# Patient Record
Sex: FEMALE | Race: WHITE | NOT HISPANIC OR LATINO | URBAN - METROPOLITAN AREA
[De-identification: names, ages, dates, MRNs, and addresses within clinical notes are randomized per-mention and may not be internally consistent; named-entity substitution may affect disease eponyms.]

---

## 2022-09-15 ENCOUNTER — APPOINTMENT (OUTPATIENT)
Dept: MRI IMAGING | Facility: CLINIC | Age: 72
End: 2022-09-15
Attending: NURSE PRACTITIONER
Payer: COMMERCIAL

## 2022-09-15 ENCOUNTER — APPOINTMENT (OUTPATIENT)
Dept: ULTRASOUND IMAGING | Facility: CLINIC | Age: 72
End: 2022-09-15
Attending: EMERGENCY MEDICINE
Payer: COMMERCIAL

## 2022-09-15 ENCOUNTER — HOSPITAL ENCOUNTER (OUTPATIENT)
Facility: CLINIC | Age: 72
Setting detail: OBSERVATION
Discharge: HOME | End: 2022-09-16
Attending: EMERGENCY MEDICINE | Admitting: HOSPITALIST
Payer: COMMERCIAL

## 2022-09-15 DIAGNOSIS — K85.10 ACUTE BILIARY PANCREATITIS, UNSPECIFIED COMPLICATION STATUS: ICD-10-CM

## 2022-09-15 DIAGNOSIS — R74.8 ELEVATED LIVER ENZYMES: ICD-10-CM

## 2022-09-15 DIAGNOSIS — R10.13 EPIGASTRIC PAIN: ICD-10-CM

## 2022-09-15 DIAGNOSIS — K80.50 CHOLEDOCHOLITHIASIS: ICD-10-CM

## 2022-09-15 LAB
ALBUMIN SERPL-MCNC: 3.7 G/DL (ref 3.4–5)
ALP SERPL-CCNC: 126 U/L (ref 40–150)
ALT SERPL W P-5'-P-CCNC: 411 U/L (ref 0–50)
ANION GAP SERPL CALCULATED.3IONS-SCNC: 8 MMOL/L (ref 3–14)
AST SERPL W P-5'-P-CCNC: 920 U/L (ref 0–45)
BASOPHILS # BLD AUTO: 0 10E3/UL (ref 0–0.2)
BASOPHILS NFR BLD AUTO: 0 %
BILIRUB SERPL-MCNC: 1.5 MG/DL (ref 0.2–1.3)
BUN SERPL-MCNC: 16 MG/DL (ref 7–30)
CALCIUM SERPL-MCNC: 8.8 MG/DL (ref 8.5–10.1)
CHLORIDE BLD-SCNC: 107 MMOL/L (ref 94–109)
CO2 SERPL-SCNC: 26 MMOL/L (ref 20–32)
CREAT SERPL-MCNC: 0.54 MG/DL (ref 0.52–1.04)
EOSINOPHIL # BLD AUTO: 0.1 10E3/UL (ref 0–0.7)
EOSINOPHIL NFR BLD AUTO: 1 %
ERYTHROCYTE [DISTWIDTH] IN BLOOD BY AUTOMATED COUNT: 12.4 % (ref 10–15)
GFR SERPL CREATININE-BSD FRML MDRD: >90 ML/MIN/1.73M2
GLUCOSE BLD-MCNC: 111 MG/DL (ref 70–99)
HCT VFR BLD AUTO: 42.1 % (ref 35–47)
HGB BLD-MCNC: 13.6 G/DL (ref 11.7–15.7)
HOLD SPECIMEN: NORMAL
IMM GRANULOCYTES # BLD: 0 10E3/UL
IMM GRANULOCYTES NFR BLD: 0 %
LIPASE SERPL-CCNC: 508 U/L (ref 73–393)
LYMPHOCYTES # BLD AUTO: 1.5 10E3/UL (ref 0.8–5.3)
LYMPHOCYTES NFR BLD AUTO: 18 %
MAGNESIUM SERPL-MCNC: 2.3 MG/DL (ref 1.6–2.3)
MCH RBC QN AUTO: 29.1 PG (ref 26.5–33)
MCHC RBC AUTO-ENTMCNC: 32.3 G/DL (ref 31.5–36.5)
MCV RBC AUTO: 90 FL (ref 78–100)
MONOCYTES # BLD AUTO: 0.6 10E3/UL (ref 0–1.3)
MONOCYTES NFR BLD AUTO: 7 %
NEUTROPHILS # BLD AUTO: 6.1 10E3/UL (ref 1.6–8.3)
NEUTROPHILS NFR BLD AUTO: 74 %
NRBC # BLD AUTO: 0 10E3/UL
NRBC BLD AUTO-RTO: 0 /100
PLATELET # BLD AUTO: 315 10E3/UL (ref 150–450)
POTASSIUM BLD-SCNC: 3.2 MMOL/L (ref 3.4–5.3)
POTASSIUM BLD-SCNC: 3.6 MMOL/L (ref 3.4–5.3)
PROT SERPL-MCNC: 7.3 G/DL (ref 6.8–8.8)
RBC # BLD AUTO: 4.68 10E6/UL (ref 3.8–5.2)
SARS-COV-2 RNA RESP QL NAA+PROBE: NEGATIVE
SODIUM SERPL-SCNC: 141 MMOL/L (ref 133–144)
TRIGL SERPL-MCNC: 69 MG/DL
TROPONIN I SERPL HS-MCNC: 4 NG/L
WBC # BLD AUTO: 8.3 10E3/UL (ref 4–11)

## 2022-09-15 PROCEDURE — 96374 THER/PROPH/DIAG INJ IV PUSH: CPT | Mod: 59

## 2022-09-15 PROCEDURE — 74183 MRI ABD W/O CNTR FLWD CNTR: CPT

## 2022-09-15 PROCEDURE — 76705 ECHO EXAM OF ABDOMEN: CPT

## 2022-09-15 PROCEDURE — 84478 ASSAY OF TRIGLYCERIDES: CPT | Performed by: NURSE PRACTITIONER

## 2022-09-15 PROCEDURE — 258N000003 HC RX IP 258 OP 636: Performed by: NURSE PRACTITIONER

## 2022-09-15 PROCEDURE — G0378 HOSPITAL OBSERVATION PER HR: HCPCS

## 2022-09-15 PROCEDURE — 250N000011 HC RX IP 250 OP 636: Performed by: EMERGENCY MEDICINE

## 2022-09-15 PROCEDURE — 255N000002 HC RX 255 OP 636: Performed by: EMERGENCY MEDICINE

## 2022-09-15 PROCEDURE — 99285 EMERGENCY DEPT VISIT HI MDM: CPT | Mod: 25

## 2022-09-15 PROCEDURE — 36415 COLL VENOUS BLD VENIPUNCTURE: CPT | Performed by: EMERGENCY MEDICINE

## 2022-09-15 PROCEDURE — C9803 HOPD COVID-19 SPEC COLLECT: HCPCS

## 2022-09-15 PROCEDURE — 99219 PR INITIAL OBSERVATION CARE,LEVEL II: CPT | Performed by: NURSE PRACTITIONER

## 2022-09-15 PROCEDURE — 93005 ELECTROCARDIOGRAM TRACING: CPT

## 2022-09-15 PROCEDURE — 250N000013 HC RX MED GY IP 250 OP 250 PS 637: Performed by: NURSE PRACTITIONER

## 2022-09-15 PROCEDURE — 83735 ASSAY OF MAGNESIUM: CPT | Performed by: NURSE PRACTITIONER

## 2022-09-15 PROCEDURE — U0003 INFECTIOUS AGENT DETECTION BY NUCLEIC ACID (DNA OR RNA); SEVERE ACUTE RESPIRATORY SYNDROME CORONAVIRUS 2 (SARS-COV-2) (CORONAVIRUS DISEASE [COVID-19]), AMPLIFIED PROBE TECHNIQUE, MAKING USE OF HIGH THROUGHPUT TECHNOLOGIES AS DESCRIBED BY CMS-2020-01-R: HCPCS | Performed by: EMERGENCY MEDICINE

## 2022-09-15 PROCEDURE — 36415 COLL VENOUS BLD VENIPUNCTURE: CPT | Performed by: NURSE PRACTITIONER

## 2022-09-15 PROCEDURE — 84484 ASSAY OF TROPONIN QUANT: CPT | Performed by: EMERGENCY MEDICINE

## 2022-09-15 PROCEDURE — 85025 COMPLETE CBC W/AUTO DIFF WBC: CPT | Performed by: EMERGENCY MEDICINE

## 2022-09-15 PROCEDURE — 84132 ASSAY OF SERUM POTASSIUM: CPT | Mod: 91 | Performed by: NURSE PRACTITIONER

## 2022-09-15 PROCEDURE — A9585 GADOBUTROL INJECTION: HCPCS | Performed by: EMERGENCY MEDICINE

## 2022-09-15 PROCEDURE — 83690 ASSAY OF LIPASE: CPT | Performed by: EMERGENCY MEDICINE

## 2022-09-15 PROCEDURE — 80053 COMPREHEN METABOLIC PANEL: CPT | Performed by: EMERGENCY MEDICINE

## 2022-09-15 RX ORDER — LEVOTHYROXINE SODIUM 50 UG/1
50 TABLET ORAL DAILY
Status: DISCONTINUED | OUTPATIENT
Start: 2022-09-15 | End: 2022-09-16 | Stop reason: HOSPADM

## 2022-09-15 RX ORDER — HYDROMORPHONE HCL IN WATER/PF 6 MG/30 ML
0.2 PATIENT CONTROLLED ANALGESIA SYRINGE INTRAVENOUS
Status: COMPLETED | OUTPATIENT
Start: 2022-09-15 | End: 2022-09-15

## 2022-09-15 RX ORDER — NORTRIPTYLINE HCL 10 MG
10 CAPSULE ORAL AT BEDTIME
Status: DISCONTINUED | OUTPATIENT
Start: 2022-09-15 | End: 2022-09-16 | Stop reason: HOSPADM

## 2022-09-15 RX ORDER — BIMATOPROST 3 UG/ML
SOLUTION TOPICAL AT BEDTIME
Status: DISCONTINUED | OUTPATIENT
Start: 2022-09-15 | End: 2022-09-15

## 2022-09-15 RX ORDER — NORTRIPTYLINE HCL 10 MG
10 CAPSULE ORAL AT BEDTIME
COMMUNITY

## 2022-09-15 RX ORDER — ACETAMINOPHEN 325 MG/1
325 TABLET ORAL EVERY 6 HOURS PRN
Status: DISCONTINUED | OUTPATIENT
Start: 2022-09-15 | End: 2022-09-16 | Stop reason: HOSPADM

## 2022-09-15 RX ORDER — ONDANSETRON 2 MG/ML
4 INJECTION INTRAMUSCULAR; INTRAVENOUS EVERY 6 HOURS PRN
Status: DISCONTINUED | OUTPATIENT
Start: 2022-09-15 | End: 2022-09-16 | Stop reason: HOSPADM

## 2022-09-15 RX ORDER — ONDANSETRON 4 MG/1
4 TABLET, ORALLY DISINTEGRATING ORAL EVERY 6 HOURS PRN
Status: DISCONTINUED | OUTPATIENT
Start: 2022-09-15 | End: 2022-09-16 | Stop reason: HOSPADM

## 2022-09-15 RX ORDER — POTASSIUM CHLORIDE 1500 MG/1
40 TABLET, EXTENDED RELEASE ORAL ONCE
Status: DISCONTINUED | OUTPATIENT
Start: 2022-09-15 | End: 2022-09-16 | Stop reason: ALTCHOICE

## 2022-09-15 RX ORDER — DICYCLOMINE HYDROCHLORIDE 10 MG/1
1-2 CAPSULE ORAL 3 TIMES DAILY PRN
COMMUNITY
Start: 2022-06-13

## 2022-09-15 RX ORDER — SODIUM CHLORIDE, SODIUM LACTATE, POTASSIUM CHLORIDE, CALCIUM CHLORIDE 600; 310; 30; 20 MG/100ML; MG/100ML; MG/100ML; MG/100ML
INJECTION, SOLUTION INTRAVENOUS CONTINUOUS
Status: DISCONTINUED | OUTPATIENT
Start: 2022-09-15 | End: 2022-09-16 | Stop reason: HOSPADM

## 2022-09-15 RX ORDER — ASPIRIN 81 MG/1
81 TABLET ORAL DAILY
COMMUNITY

## 2022-09-15 RX ORDER — LEVOTHYROXINE SODIUM 50 UG/1
50 TABLET ORAL DAILY
COMMUNITY
Start: 2022-01-18

## 2022-09-15 RX ORDER — ELECTROLYTES/DEXTROSE
1 SOLUTION, ORAL ORAL DAILY
COMMUNITY

## 2022-09-15 RX ORDER — PROCHLORPERAZINE MALEATE 5 MG
5 TABLET ORAL EVERY 6 HOURS PRN
Status: DISCONTINUED | OUTPATIENT
Start: 2022-09-15 | End: 2022-09-16 | Stop reason: HOSPADM

## 2022-09-15 RX ORDER — GADOBUTROL 604.72 MG/ML
6 INJECTION INTRAVENOUS ONCE
Status: COMPLETED | OUTPATIENT
Start: 2022-09-15 | End: 2022-09-15

## 2022-09-15 RX ORDER — BIMATOPROST 3 UG/ML
SOLUTION TOPICAL
COMMUNITY
Start: 2022-06-14

## 2022-09-15 RX ORDER — SIMVASTATIN 20 MG
20 TABLET ORAL AT BEDTIME
COMMUNITY

## 2022-09-15 RX ORDER — LEVOTHYROXINE SODIUM 25 UG/1
TABLET ORAL DAILY
COMMUNITY
End: 2022-09-15

## 2022-09-15 RX ORDER — PROCHLORPERAZINE 25 MG
12.5 SUPPOSITORY, RECTAL RECTAL EVERY 12 HOURS PRN
Status: DISCONTINUED | OUTPATIENT
Start: 2022-09-15 | End: 2022-09-16 | Stop reason: HOSPADM

## 2022-09-15 RX ADMIN — SODIUM CHLORIDE, POTASSIUM CHLORIDE, SODIUM LACTATE AND CALCIUM CHLORIDE: 600; 310; 30; 20 INJECTION, SOLUTION INTRAVENOUS at 10:55

## 2022-09-15 RX ADMIN — HYDROMORPHONE HYDROCHLORIDE 0.2 MG: 0.2 INJECTION, SOLUTION INTRAMUSCULAR; INTRAVENOUS; SUBCUTANEOUS at 07:30

## 2022-09-15 RX ADMIN — SODIUM CHLORIDE, POTASSIUM CHLORIDE, SODIUM LACTATE AND CALCIUM CHLORIDE: 600; 310; 30; 20 INJECTION, SOLUTION INTRAVENOUS at 22:11

## 2022-09-15 RX ADMIN — LEVOTHYROXINE SODIUM 50 MCG: 50 TABLET ORAL at 14:12

## 2022-09-15 RX ADMIN — GADOBUTROL 6 ML: 604.72 INJECTION INTRAVENOUS at 12:57

## 2022-09-15 RX ADMIN — NORTRIPTYLINE HYDROCHLORIDE 10 MG: 10 CAPSULE ORAL at 22:10

## 2022-09-15 RX ADMIN — SODIUM CHLORIDE, POTASSIUM CHLORIDE, SODIUM LACTATE AND CALCIUM CHLORIDE: 600; 310; 30; 20 INJECTION, SOLUTION INTRAVENOUS at 13:20

## 2022-09-15 ASSESSMENT — ACTIVITIES OF DAILY LIVING (ADL)
ADLS_ACUITY_SCORE: 33
ADLS_ACUITY_SCORE: 33
ADLS_ACUITY_SCORE: 35
ADLS_ACUITY_SCORE: 35
ADLS_ACUITY_SCORE: 33
ADLS_ACUITY_SCORE: 33
ADLS_ACUITY_SCORE: 35
ADLS_ACUITY_SCORE: 33
ADLS_ACUITY_SCORE: 35

## 2022-09-15 NOTE — PHARMACY-ADMISSION MEDICATION HISTORY
Pharmacy Medication History  Admission medication history interview status for the 9/15/2022  admission is complete. See EPIC admission navigator for prior to admission medications     Location of Interview: Patient room  Medication history sources: Patient and Surescripts    Significant changes made to the medication list:  Added:   - Latisse 0.03% eye solution - apply to upper lashline daily at bedtime, per pt & Surescripts   - Bentyl 10mg capsule - 1-2 capsules three times daily prn cramping, per pt & Surescripts   - multivitamin tablet - 1 tablet daily, per pt   - aspirin 81mg EC tablet - 1 tablet daily, per pt     Deleted: None  Changed:   - levothyroxine 25mcg --> 50mcg tablet daily, per pt & Surescripts    In the past week, patient estimated taking medication this percent of the time: greater than 90%    Additional medication history information:   - Patient had medication list in phone to verify meds and states took last dose yesterday PM  - Per pt, PCP in [place] recommended she take aspirin 81mg tablets daily for heart health  - Pt states she prefers any discharge medications to be sent to [id number] in [place], [place]      Medication reconciliation completed by provider prior to medication history? No    Time spent in this activity: 15 minutes    Prior to Admission medications    Medication Sig Last Dose Taking? Auth Provider Long Term End Date   aspirin 81 MG EC tablet Take 81 mg by mouth daily 9/14/2022 at AM Yes Unknown, Entered By History     bimatoprost (LATISSE) 0.03 % external opthalmic solution APPLY EVENLY ALONG THE SKIN OF THE UPPER EYELIDS AT THE BASE OF THE EYELASHES ONCE DAILY AT BEDTIME 9/14/2022 at PM Yes Unknown, Entered By History     dicyclomine (BENTYL) 10 MG capsule Take 1-2 capsules by mouth 3 times daily as needed for cramping 9/14/2022 at PM Yes Unknown, Entered By History     levothyroxine (SYNTHROID/LEVOTHROID) 50 MCG tablet Take 50 mcg by mouth daily 9/14/2022 at AM Yes  Unknown, Entered By History Yes    Multiple Vitamin (MULTIVITAMIN ADULT) TABS Take 1 tablet by mouth daily 9/14/2022 at AM Yes Unknown, Entered By History     nortriptyline (PAMELOR) 10 MG capsule Take 10 mg by mouth At Bedtime 9/14/2022 at PM Yes Reported, Patient Yes    simvastatin (ZOCOR) 20 MG tablet Take 20 mg by mouth At Bedtime 9/14/2022 at PM Yes Reported, Patient Yes        The information provided in this note is only as accurate as the sources available at the time of update(s)

## 2022-09-15 NOTE — CONSULTS
[facility] Consultation     Judith Almeida  [address] [place] [address]  72 year old female     Admission Date/Time: 9/15/2022  Primary Care Provider: Maryam Ref-Primary, Physician  Referring / Attending Physician:  Sharron Sylvester     We were asked to see the patient in consultation by Sharron Sylvester CNP in the ED for evaluation of elevated LFTs.    ASSESSMENT:    LFTs/lipase are suggestive of either recently passed biliary stone/sludge, or acutely impacted stone/sludge.    RECOMMENDATIONS:  MRCP  Repeat liver chemistries in the morning  N.p.o. at midnight    Thank you for involving us in this patient's care.  Please call me with any questions.    Total time spent in chart review, direct medical discussion, examination, and documentation was 40 minutes      AFTERNOON ADDENDUM:  MRCP neg for retained stone.   Suggest diet be advanced, recheck LFTs in the AM.     Fracisco Hernández DO   [facility]  Cell [phone]    ________________________________________________________________________        CC: Abdominal pain     HPI:  Judith Almeida is a 72 year old female who we are asked to see by the emergency room staff for abdominal pain.    The patient had her gallbladder taken out greater than 10 years ago, but over the last 3 years she has had episodic issues (3 total) of abdominal pain requiring urgent evaluation.  At least 1 of these prior evaluations have included an MRCP and she tells me that no further intervention was needed.  This was apparently 1 year ago.    She recalls present issue with abdominal pain starting last night.  This started relatively acutely, in the epigastrium and right upper quadrant.  There is no specific radiation, or palliative or provocative factors.  She had some mild nausea.  There was one episode of diarrhea.  Since being seen in the emergency department she has had improvement in her pain after narcotic use.  She was found to have significantly elevated  "AST greater than ALT, total bilirubin 1.5, lipase 508.  She does have an ultrasound demonstrating status postcholecystectomy, common bile duct dilation of 11 mm.     ROS: A comprehensive ten point review of systems was negative aside from those in mentioned in the HPI.      PAST MEDICAL HISTORY:  Patient Active Problem List    Diagnosis Date Noted     Choledocholithiasis 09/15/2022     Priority: Medium     Epigastric pain 09/15/2022     Priority: Medium     Elevated liver enzymes 09/15/2022     Priority: Medium     Acute biliary pancreatitis, unspecified complication status 09/15/2022     Priority: Medium     SOCIAL HISTORY:  Social History     Substance Use Topics     Alcohol use: Never     Drug use: Never     FAMILY HISTORY:  History reviewed. No pertinent family history.  ALLERGIES:   Allergies   Allergen Reactions     Dilaudid [Hydromorphone]      Hypotension, near syncope     MEDICATIONS:   Current Facility-Administered Medications   Medication     acetaminophen (TYLENOL) tablet 325 mg     lactated ringers infusion     ondansetron (ZOFRAN) injection 4 mg    Or     ondansetron (ZOFRAN ODT) ODT tab 4 mg     Current Outpatient Medications   Medication     aspirin 81 MG EC tablet     bimatoprost (LATISSE) 0.03 % external opthalmic solution     dicyclomine (BENTYL) 10 MG capsule     levothyroxine (SYNTHROID/LEVOTHROID) 50 MCG tablet     Multiple Vitamin (MULTIVITAMIN ADULT) TABS     nortriptyline (PAMELOR) 10 MG capsule     simvastatin (ZOCOR) 20 MG tablet     PHYSICAL EXAM:   /75   Pulse 92   Temp 97.8  F (36.6  C) (Oral)   Resp 14   Ht 1.676 m (5' 6\")   Wt 56.7 kg (125 lb)   SpO2 99%   BMI 20.18 kg/m     GEN: Alert, oriented x3, communicative and in NAD.  GALINA: AT, anicteric, OP without erythema, exudate, or ulcers.    NECK: Supple.    LYMPH: No LAD noted.  HRT: RRR  LUNGS: CTA  ABD: ND, +BS, mild tenderness to palpate been in the epigastrium and right upper quadrant without rebound.  SKIN: No rash, " jaundice or spider angiomata     ADDITIONAL DATA:   I reviewed the patient's new clinical lab test results.   Recent Labs   Lab Test 09/15/22  0649   WBC 8.3   HGB 13.6   MCV 90        Recent Labs   Lab Test 09/15/22  0649   POTASSIUM 3.2*   CHLORIDE 107   CO2 26   BUN 16   ANIONGAP 8     Recent Labs   Lab Test 09/15/22  0649   ALBUMIN 3.7   BILITOTAL 1.5*   *   *   LIPASE 508*        I reviewed the patient's new imaging results.

## 2022-09-15 NOTE — H&P
[facility]    History and Physical - Hospitalist Service       Date of Admission:  9/15/2022    Assessment & Plan      Judith Almeida is a 72 year old female w/ PMH of irritable bowel syndrome, hypothyroidism, hyperlipidemia, skin cancer registered observation on 9/15/2022 for possible choledocholithiasis    abnl LFTs w/ hepatocellular and cholestatic pattern, possible choledocholithiasis vs mild pancreatitis by ultrasound with CBD of 11 mm multiple hepatic cyst in a patient who status post cholecystectomy  -consult [facility] for possible ERCP vs MRCP--> discussed with gastroenterology attending physician & will obtain MRCP first  -NPO  -IVF  -analgesia w/ low dose apap and antiemetics  -check triglycerides  -trend CMP and lipase in a.m.    Hypotenion in ED 2/2 asovagal episode vs medication side effects in ED following opioid administration w/o LOC  -telemetry  -orthostatic BPs  -ekg  -Opioids listed as intolerance/side effect    Hypokalemia, mild 3.2  -add on Mg level & replacement protocol  -replace K via protocol    HLD  -hold statin w/ acute hepatitis    Hypothyroidism  -continue PTA levothryroxine    covid-19 status  -neg 09/15/22       Diet: NPO for Medical/Clinical Reasons Except for: Meds    DVT Prophylaxis: Ambulate every shift  Cobb Catheter: Not present  Central Lines: None  Cardiac Monitoring: Telemetry x24 hours  Code Status: Full Code full code    Clinically Significant Risk Factors Present on Admission                          Disposition Plan     TBD pending clinical course    The patient's care was discussed with the Attending Physician, Dr. Veronica Singh and Patient.    Total time is 35 minutes of which 21 minutes was face-to-face interviewing and examining patient.     ELYSSA Coker [facility]  Hospitalist Service  [facility]  Securely message with the Vocera Web Console (learn more here)  Text page via [employer] Paging/Directory      ______________________________________________________________________    Chief Complaint   Abdominal pain    History is obtained from the patient    History of Present Illness   Judith Almeida is a 72 year old female with irritable bowel syndrome, hypothyroidism, hyperlipidemia, skin cancer who presented to [facility] ED with sudden onset abdominal pain beginning at 10 PM on 2022.  Patient describes the pain as 8/10 in severity, deep and burning in the epigastrium.  She had minimal temporary relief with Tums.  The pain persisted all evening prompting her to seek ED evaluation on the a.m. of 9/15/2022.    In the ED patient underwent right upper quadrant ultrasound which did not show any stone but the common bile duct was dilated to 11 mm.  There are multiple hepatic cyst.  She was given 0.2 mg IV hydromorphone thereafter became acutely hypotensive 57/30, 61/41 with heart rates in the 40s.  There is no loss of consciousness but patient was presyncopal.  She was laid flat and given 1 L fluid bolus with 0.9% saline with subsequent resumption of hemodynamic stability.  Lab data were remarkable only for elevated AST, ALT, total bilirubin and lipase.  Hospitalist service was asked to register the patient observation for possible choledocholithiasis.    Review of Systems    A complete 10 point ROS was obtained, see above for pertinent positives or negatives, all other systems are negative    Past Medical History    I have reviewed this patient's medical history and updated it with pertinent information if needed.   Past Medical History:   Diagnosis Date     High cholesterol      Irritable bowel syndrome    skin cancer    Past Surgical History   I have reviewed this patient's surgical history and updated it with pertinent information if needed.  History reviewed. No pertinent surgical history.  Cholecystectomy   x2  RSO secondary to cyst    Social History   I have reviewed this patient's social history  and updated it with pertinent information if needed.  Social History     Substance Use Topics     Alcohol use: Never     Drug use: Never   remote/former THC and smoking use  Works for [place] [employer], resides in [place], [place]  Receives primary care and gastroenterology treatment in [place]    Family History   2 children alive and well    Prior to Admission Medications   Prior to Admission Medications   Prescriptions Last Dose Informant Patient Reported? Taking?   Multiple Vitamin (MULTIVITAMIN ADULT) TABS 9/14/2022 at AM  Yes Yes   Sig: Take 1 tablet by mouth daily   aspirin 81 MG EC tablet 9/14/2022 at AM  Yes Yes   Sig: Take 81 mg by mouth daily   bimatoprost (LATISSE) 0.03 % external opthalmic solution 9/14/2022 at PM  Yes Yes   Sig: APPLY EVENLY ALONG THE SKIN OF THE UPPER EYELIDS AT THE BASE OF THE EYELASHES ONCE DAILY AT BEDTIME   dicyclomine (BENTYL) 10 MG capsule 9/14/2022 at PM  Yes Yes   Sig: Take 1-2 capsules by mouth 3 times daily as needed for cramping   levothyroxine (SYNTHROID/LEVOTHROID) 50 MCG tablet 9/14/2022 at AM  Yes Yes   Sig: Take 50 mcg by mouth daily   nortriptyline (PAMELOR) 10 MG capsule 9/14/2022 at PM  Yes Yes   Sig: Take 10 mg by mouth At Bedtime   simvastatin (ZOCOR) 20 MG tablet 9/14/2022 at PM  Yes Yes   Sig: Take 20 mg by mouth At Bedtime      Facility-Administered Medications: None     Allergies   Allergies   Allergen Reactions     Dilaudid [Hydromorphone]      Hypotension, near syncope       Physical Exam   Vital Signs: Temp: 97.8  F (36.6  C) Temp src: Oral BP: 132/75 Pulse: 92   Resp: 14 SpO2: 99 % O2 Device: None (Room air)    Weight: 125 lbs 0 oz    Constitutional: vs /84, heart 98, SPO2 90% on room air  General:  adult pt lying in bed without acute distress  Neuro: +follows commands wiggle toes and show 2 fingers bilat, face symmetric, tongue midline, speech fluent  Eyes pupils equal round 3mm briskly reactive bilat, sclera nonicteric, noninjected,  conjunctiva pink,  Head, ENT & mouth: NC/AT,  mouth moist oral mucosa  Neck: supple  CV S1S2 no murmurs, normotensive  resp: CTAB upper and lower lobes  gi:normoactive bowel sounds, soft, nontender, nondisteded  Ext: no edema  Skin: no rashes on exposed skin  Lymph: Defer  Musculoskeletal no bony joint deformities    Data   Data reviewed today: I reviewed all medications, new labs and imaging results over the last 24 hours. EKG is pending.      Recent Labs   Lab 09/15/22  0649   WBC 8.3   HGB 13.6   MCV 90         POTASSIUM 3.2*   CHLORIDE 107   CO2 26   BUN 16   CR 0.54   ANIONGAP 8   LESLY 8.8   *   ALBUMIN 3.7   PROTTOTAL 7.3   BILITOTAL 1.5*   ALKPHOS 126   *   *   LIPASE 508*     Recent Results (from the past 24 hour(s))   US Abdomen Limited    Narrative    ULTRASOUND ABDOMEN LIMITED  9/15/2022 8:28 AM    CLINICAL HISTORY: Right lower quadrant pain.    TECHNIQUE: Limited abdominal ultrasound.    COMPARISON: None.    FINDINGS:  GALLBLADDER: Cholecystectomy.    BILE DUCTS: There is no biliary dilatation. The common duct measures  11 mm.    LIVER: Liver measures 16.7 cm. There are multiple hepatic cysts that  are mildly lobulated but otherwise appear simple. An example at the  lateral left liver is 1.8 x 1.1 x 1.8 cm. There are other bilateral  examples.    RIGHT KIDNEY: No hydronephrosis.    PANCREAS: Pancreas duct is mildly ectatic measuring 4 mm.    No ascites.      Impression    IMPRESSION:  1.  Cholecystectomy. Dilated common duct measuring 11 mm and mildly  ectatic pancreas duct measuring 4 mm.  2.  A few scattered hepatic cysts noted.

## 2022-09-15 NOTE — ED NOTES
"Pt ambulated to bathroom with no dizziness.  States feeling \"much better\" and pain is better controlled.    "

## 2022-09-15 NOTE — ED NOTES
"Pt on call light reporting \"feeling terrible from pain meds.\" Vasovagal response, dropped BP to 50s SBP, and to. Updated primary RN and Primary MD, started fluids, put head of bed down. Pt able to recover BP and HR back to normal.   "

## 2022-09-15 NOTE — PROGRESS NOTES
09/15/2022 [id number]    Pt here for abdominal pain due to choledocholithiasis. Hx: IBS, hypothyroid, HLP, skin cancer. VSS on RA. Pt became hypotensive and to in the ED which was managed with fluids and pt is stable now. Continent B&B. Ambulating to BR. IND in room. K+: 3.2. pt refused potassium replacement as it increases her bowels and makes her IBS worse. Rechecked K+ at 1600 and it is 3.6. PIV infusing LR at 100mL/hr. Regular diet. Will be NPO at [place] (possible surgery tomorrow pending results of MRI). Tele: SR.  at bedside.       Observation goals:   -diagnostic tests and consults completed and resulted: not met  -vital signs normal or at patient's baseline: met  -tolerating oral intake to maintain hydration: in process. Regular diet now but NPO at MN  -safe disposition plan has been identified: met      Nurse to notify provider when observation goals have been met and patient is ready for discharge.

## 2022-09-15 NOTE — ED PROVIDER NOTES
"  History   Chief Complaint:  Abdominal Pain         HPI   Judith Almeida is a 72 year old female with history of IBS who presents for evaluation of burning epigastric abdominal pain that began at 10 PM last night.  The pain started in her nightly but has now become more constant.    The patient flew in from [facility] with her .  This has happened before when she travels but this episode is more intense.  Patient had an episode of diarrhea yesterday afternoon approximately 2 PM.  No reported melena or hematochezia.  Patient denies vomiting but is nauseous which she attributes to the pain increase.  The patient denies fever or dysuria.  The patient is status post cholecystectomy    Allergies:  No Known Allergies    Medications:   levothyroxine (SYNTHROID/LEVOTHROID) 25 MCG tablet  nortriptyline (PAMELOR) 10 MG capsule  simvastatin (ZOCOR) 20 MG tablet        Past Medical History:    Past Medical History:   Diagnosis Date     High cholesterol      Irritable bowel syndrome      There is no problem list on file for this patient.       Past Surgical History:    History reviewed. No pertinent surgical history.     Family History:    History reviewed. No pertinent family history.    Social History:  PCP: Physician No Ref-Primary  Presents to the ED with her   Patient lives in [facility].  She recently arrived via airline to [place].  Social History     Substance Use Topics     Alcohol use: Never     Drug use: Never       Review of Systems  See the HPI, otherwise the rest of the ROS is negative.      Physical Exam     Patient Vitals for the past 24 hrs:   BP Temp Temp src Pulse Resp SpO2 Height Weight   09/15/22 1101 -- -- -- -- -- -- 1.676 m (5' 6\") 56.7 kg (125 lb)   09/15/22 1045 -- -- -- -- -- 99 % -- --   09/15/22 1030 132/75 -- -- 92 -- 97 % -- --   09/15/22 1015 -- -- -- -- -- 99 % -- --   09/15/22 1000 134/84 -- -- 93 -- 98 % -- --   09/15/22 0945 -- -- -- -- -- 97 % -- -- "   09/15/22 0930 (!) 138/90 -- -- 91 -- 99 % -- --   09/15/22 0915 -- -- -- -- -- 99 % -- --   09/15/22 0900 127/84 -- -- 81 -- 100 % -- --   09/15/22 0845 -- -- -- 79 14 96 % -- --   09/15/22 0830 130/66 -- -- 78 15 96 % -- --   09/15/22 0800 123/71 -- -- 74 11 95 % -- --   09/15/22 0750 94/57 -- -- 65 13 96 % -- --   09/15/22 0745 (!) 61/41 -- -- (!) 48 13 95 % -- --   09/15/22 0740 (!) 57/30 -- -- 111 9 95 % -- --   09/15/22 0730 -- -- -- 91 (!) 6 99 % -- --   09/15/22 0715 -- -- -- 97 12 99 % -- --   09/15/22 0700 (!) 132/90 -- -- 89 (!) 33 97 % -- --   09/15/22 0630 (!) 138/90 97.8  F (36.6  C) Oral 98 13 96 % -- --       Physical Exam  General: Alert, No distress. Nontoxic appearance  Head: No signs of trauma.   Mouth/Throat: Oropharynx moist.   Eyes: Conjunctivae are normal. Pupils are equal..   Neck: Normal range of motion.    CV: Appears well perfused.  Resp:No respiratory distress.   Abdomen: Soft.  No reproducible pain with palpation  MSK: Normal range of motion. No obvious deformity.   Neuro: The patient is alert and interactive. BEATTY. Speech normal. GCS 15  Skin: No lesion or sign of trauma noted.   Psych: normal mood and affect. behavior is normal.       Emergency Department Course     ECG:  ECG taken at 1156, ECG read at 1203 by Guerrero Mtz MD  Normal sinus rhythm  Rate 82 bpm. IL interval 138. QRS duration 80. QT/QTc 396/462. P-R-T axes 63 68 59.      Imaging:  MR Abdomen MRCP w/o & w Contrast   Final Result   IMPRESSION:   1.  Moderate intra and extrahepatic biliary dilatation with no   definite cause identified. This finding could be related to the   patient's age and postcholecystectomy state, or possibly a recently   passed biliary stone. No convincing evidence for choledocholithiasis.   2.  Mild prominence of the pancreatic duct measures 0.4 cm. No other   pancreatic abnormalities.      ROSALIO PAINTING MD            SYSTEM ID:  [id number]      US Abdomen Limited   Final Result   IMPRESSION:    1.  Cholecystectomy. Dilated common duct measuring 11 mm and mildly   ectatic pancreas duct measuring 4 mm.   2.  A few scattered hepatic cysts noted.      CINDY SUAZO MD            SYSTEM ID:  [id number]         Laboratory:  Labs Ordered and Resulted from Time of ED Arrival to Time of ED Departure   COMPREHENSIVE METABOLIC PANEL - Abnormal       Result Value    Sodium 141      Potassium 3.2 (*)     Chloride 107      Carbon Dioxide (CO2) 26      Anion Gap 8      Urea Nitrogen 16      Creatinine 0.54      Calcium 8.8      Glucose 111 (*)     Alkaline Phosphatase 126       (*)      (*)     Protein Total 7.3      Albumin 3.7      Bilirubin Total 1.5 (*)     GFR Estimate >90     LIPASE - Abnormal    Lipase 508 (*)    TROPONIN I - Normal    Troponin I High Sensitivity 4     COVID-19 VIRUS (CORONAVIRUS) BY PCR - Normal    SARS CoV2 PCR Negative     TRIGLYCERIDES - Normal    Triglycerides 69     MAGNESIUM - Normal    Magnesium 2.3     CBC WITH PLATELETS AND DIFFERENTIAL    WBC Count 8.3      RBC Count 4.68      Hemoglobin 13.6      Hematocrit 42.1      MCV 90      MCH 29.1      MCHC 32.3      RDW 12.4      Platelet Count 315      % Neutrophils 74      % Lymphocytes 18      % Monocytes 7      % Eosinophils 1      % Basophils 0      % Immature Granulocytes 0      NRBCs per 100 WBC 0      Absolute Neutrophils 6.1      Absolute Lymphocytes 1.5      Absolute Monocytes 0.6      Absolute Eosinophils 0.1      Absolute Basophils 0.0      Absolute Immature Granulocytes 0.0      Absolute NRBCs 0.0           Interventions:  Medications   HYDROmorphone (DILAUDID) injection 0.2 mg (has no administration in time range)       Emergency Department Course/Medical Decision Making:  Judith Almeida is a 72 year old female who presents with abdominal pain.  The workup in the Emergency Room is concerning for acute choledocholithiasis.  She is status postcholecystectomy.  Tthe patient will be admitted to the medicine service for  further evaluation and treatment with a likely gastroenterology consultation  There is no evidence at this point of serious complications of choledocholithiasis such as septic shock, etc.  No signs of other complications such as ascending cholangitis.  However, she had appears to have mild liver inflammation and pancreatitis.  Given constellation of symptoms, lab data and ultrasound I doubt GERD, gastritis, PUD, perforated ulcer, diverticulitis, colitis.    She will be admitted to the hospitalist service for GI consultation and probable ERCP.    Diagnosis:    ICD-10-CM    1. Choledocholithiasis  K80.50    2. Acute biliary pancreatitis, unspecified complication status  K85.10    3. Elevated liver enzymes  R74.8    4. Epigastric pain  R10.13        Disposition:  Admitted to the hospitalist service.       Guerrero Mtz MD  09/15/22 2027

## 2022-09-15 NOTE — ED NOTES
Bed: ED11  Expected date:   Expected time:   Means of arrival:   Comments:  514  72 F Abd pain  0612   Problem: Pain, Acute (Adult)  Goal: Identify Related Risk Factors and Signs and Symptoms  Outcome: Ongoing (interventions implemented as appropriate)

## 2022-09-15 NOTE — ED NOTES
[facility]  ED Nurse Handoff Report    ED Chief complaint: Abdominal Pain      ED Diagnosis:   Final diagnoses:   Choledocholithiasis   Acute biliary pancreatitis, unspecified complication status   Elevated liver enzymes   Epigastric pain       Code Status: Full Code    Allergies: No Known Allergies    Patient Story: Assume pt care. Pt aaox4. Resp even and unlabored. Pt c/o epigastric abdominal px that started 10pm last night. Pt describes px 8/10, deep and burning and nausea.  Plan of care discussed, all questions answered. Call light in hand.             Triage Assessment            Row Name 09/15/22 0640             Triage Assessment (Adult)      Airway WDL WDL             Respiratory WDL      Respiratory WDL WDL             Skin Circulation/Temperature WDL      Skin Circulation/Temperature WDL WDL             Cardiac WDL      Cardiac WDL WDL             Peripheral/Neurovascular WDL      Peripheral Neurovascular WDL WDL             Cognitive/Neuro/Behavioral WDL      Cognitive/Neuro/Behavioral WDL WDL             [place] Coma Scale      Best Eye Response 4-->(E4) spontaneous      Best Motor Response 6-->(M6) obeys commands      Best Verbal Response 5-->(V5) oriented      Cy Coma Scale Score 15               Focused Assessment:  GI    Treatments and/or interventions provided: pain medicine, monitor, iv fluids  Patient's response to treatments and/or interventions: pt had vasovagal episode with dilaudid but recovered with 1 liter ns.  Pain controlled    To be done/followed up on inpatient unit:  same as above, mrcp    Does this patient have any cognitive concerns?: n/a    Activity level - Baseline/Home:  Independent  Activity Level - Current:   Independent    Patient's Preferred language: English   Needed?: No    Isolation: None  Infection: Not Applicable  Patient tested for COVID 19 prior to admission: YES  Bariatric?: No    Vital Signs:   Vitals:    09/15/22 0750 09/15/22 0800  09/15/22 0830 09/15/22 0845   BP: 94/57 123/71 130/66    Pulse: 65 74 78 79   Resp: 13 11 15 14   Temp:       TempSrc:       SpO2: 96% 95% 96% 96%       Cardiac Rhythm:     Was the PSS-3 completed:   Yes  What interventions are required if any?               Family Comments:  in room with pt  OBS brochure/video discussed/provided to patient/family: N/A              Name of person given brochure if not patient:               Relationship to patient:     For the majority of the shift this patient's behavior was Green.   Behavioral interventions performed were n/a    ED NURSE PHONE NUMBER: [phone]

## 2022-09-15 NOTE — PROGRESS NOTES
RECEIVING UNIT ED HANDOFF REVIEW    ED Nurse Handoff Report was reviewed by: Senia Vale RN on September 15, 2022 at 12:56 PM

## 2022-09-15 NOTE — PROGRESS NOTES
Observation goals:   -diagnostic tests and consults completed and resulted: not met. Pending MRI results.  -vital signs normal or at patient's baseline: met  -tolerating oral intake to maintain hydration: in process. Regular diet now but NPO at MN  -safe disposition plan has been identified: met      Nurse to notify provider when observation goals have been met and patient is ready for discharge.

## 2022-09-15 NOTE — PROGRESS NOTES
09/15/2022 [id number]    Pt here for abdominal pain due to choledocholithiasis. Hx: IBS, hypothyroid, HLP, skin cancer. VSS on RA. Pt became hypotensive and to in the ED which was managed with fluids and pt is stable now. Continent B&B. Ambulating to BR. IND in room. K+: 3.2. pt refused potassium replacement as it increases her bowels and makes her IBS worse. K+ will be rechecked at 1400 and if lower, K+ will need to be replaced. Pt understands this plan and is compliant for taking the replacement after the next K+ lab. PIV infusing LR at 100mL/hr. Regular diet. Will be NPO at MN (possible surgery tomorrow?? Need clarification of procedure tomorrow).  at bedside.       Observation goals:   -diagnostic tests and consults completed and resulted: not met  -vital signs normal or at patient's baseline: met  -tolerating oral intake to maintain hydration: in process. Regular diet now but NPO at MN  -safe disposition plan has been identified: met      Nurse to notify provider when observation goals have been met and patient is ready for discharge.

## 2022-09-15 NOTE — ED TRIAGE NOTES
Assume pt care. Pt aaox4. Resp even and unlabored. Pt c/o epigastric abdominal px that started 10pm last night. Pt describes px 8/10, deep and burning and nausea.  Plan of care discussed, all questions answered. Call light in hand.     Triage Assessment     Row Name 09/15/22 0640       Triage Assessment (Adult)    Airway WDL WDL       Respiratory WDL    Respiratory WDL WDL       Skin Circulation/Temperature WDL    Skin Circulation/Temperature WDL WDL       Cardiac WDL    Cardiac WDL WDL       Peripheral/Neurovascular WDL    Peripheral Neurovascular WDL WDL       Cognitive/Neuro/Behavioral WDL    Cognitive/Neuro/Behavioral WDL WDL       Cy Coma Scale    Best Eye Response 4-->(E4) spontaneous    Best Motor Response 6-->(M6) obeys commands    Best Verbal Response 5-->(V5) oriented    [place] Coma Scale Score 15

## 2022-09-16 VITALS
TEMPERATURE: 98.2 F | SYSTOLIC BLOOD PRESSURE: 126 MMHG | RESPIRATION RATE: 16 BRPM | DIASTOLIC BLOOD PRESSURE: 80 MMHG | HEART RATE: 74 BPM | HEIGHT: 66 IN | OXYGEN SATURATION: 97 % | WEIGHT: 125 LBS | BODY MASS INDEX: 20.09 KG/M2

## 2022-09-16 LAB
ALBUMIN SERPL-MCNC: 3.2 G/DL (ref 3.4–5)
ALP SERPL-CCNC: 115 U/L (ref 40–150)
ALT SERPL W P-5'-P-CCNC: 402 U/L (ref 0–50)
ANION GAP SERPL CALCULATED.3IONS-SCNC: 3 MMOL/L (ref 3–14)
AST SERPL W P-5'-P-CCNC: 302 U/L (ref 0–45)
ATRIAL RATE - MUSE: 82 BPM
BILIRUB SERPL-MCNC: 0.7 MG/DL (ref 0.2–1.3)
BUN SERPL-MCNC: 9 MG/DL (ref 7–30)
CALCIUM SERPL-MCNC: 8.9 MG/DL (ref 8.5–10.1)
CHLORIDE BLD-SCNC: 109 MMOL/L (ref 94–109)
CO2 SERPL-SCNC: 30 MMOL/L (ref 20–32)
CREAT SERPL-MCNC: 0.57 MG/DL (ref 0.52–1.04)
DIASTOLIC BLOOD PRESSURE - MUSE: NORMAL MMHG
ERYTHROCYTE [DISTWIDTH] IN BLOOD BY AUTOMATED COUNT: 12.3 % (ref 10–15)
GFR SERPL CREATININE-BSD FRML MDRD: >90 ML/MIN/1.73M2
GLUCOSE BLD-MCNC: 99 MG/DL (ref 70–99)
HCT VFR BLD AUTO: 35.2 % (ref 35–47)
HGB BLD-MCNC: 11.5 G/DL (ref 11.7–15.7)
INTERPRETATION ECG - MUSE: NORMAL
LIPASE SERPL-CCNC: 305 U/L (ref 73–393)
MAGNESIUM SERPL-MCNC: 2.2 MG/DL (ref 1.6–2.3)
MCH RBC QN AUTO: 28.9 PG (ref 26.5–33)
MCHC RBC AUTO-ENTMCNC: 32.7 G/DL (ref 31.5–36.5)
MCV RBC AUTO: 88 FL (ref 78–100)
P AXIS - MUSE: 63 DEGREES
PLATELET # BLD AUTO: 238 10E3/UL (ref 150–450)
POTASSIUM BLD-SCNC: 3.3 MMOL/L (ref 3.4–5.3)
PR INTERVAL - MUSE: 138 MS
PROT SERPL-MCNC: 6.1 G/DL (ref 6.8–8.8)
QRS DURATION - MUSE: 80 MS
QT - MUSE: 396 MS
QTC - MUSE: 462 MS
R AXIS - MUSE: 68 DEGREES
RBC # BLD AUTO: 3.98 10E6/UL (ref 3.8–5.2)
SODIUM SERPL-SCNC: 142 MMOL/L (ref 133–144)
SYSTOLIC BLOOD PRESSURE - MUSE: NORMAL MMHG
T AXIS - MUSE: 59 DEGREES
VENTRICULAR RATE- MUSE: 82 BPM
WBC # BLD AUTO: 4.6 10E3/UL (ref 4–11)

## 2022-09-16 PROCEDURE — 250N000013 HC RX MED GY IP 250 OP 250 PS 637: Performed by: NURSE PRACTITIONER

## 2022-09-16 PROCEDURE — 99217 PR OBSERVATION CARE DISCHARGE: CPT | Performed by: HOSPITALIST

## 2022-09-16 PROCEDURE — 85027 COMPLETE CBC AUTOMATED: CPT | Performed by: NURSE PRACTITIONER

## 2022-09-16 PROCEDURE — 83735 ASSAY OF MAGNESIUM: CPT | Performed by: NURSE PRACTITIONER

## 2022-09-16 PROCEDURE — 83690 ASSAY OF LIPASE: CPT | Performed by: NURSE PRACTITIONER

## 2022-09-16 PROCEDURE — G0378 HOSPITAL OBSERVATION PER HR: HCPCS

## 2022-09-16 PROCEDURE — 80053 COMPREHEN METABOLIC PANEL: CPT | Performed by: NURSE PRACTITIONER

## 2022-09-16 PROCEDURE — 36415 COLL VENOUS BLD VENIPUNCTURE: CPT | Performed by: NURSE PRACTITIONER

## 2022-09-16 RX ORDER — POTASSIUM CHLORIDE 1.5 G/1.58G
40 POWDER, FOR SOLUTION ORAL ONCE
Status: DISCONTINUED | OUTPATIENT
Start: 2022-09-16 | End: 2022-09-16 | Stop reason: HOSPADM

## 2022-09-16 RX ORDER — POTASSIUM CHLORIDE 1.5 G/1.58G
20 POWDER, FOR SOLUTION ORAL DAILY
Qty: 7 PACKET | Refills: 0 | Status: SHIPPED | OUTPATIENT
Start: 2022-09-16 | End: 2022-09-23

## 2022-09-16 RX ADMIN — LEVOTHYROXINE SODIUM 50 MCG: 50 TABLET ORAL at 08:11

## 2022-09-16 ASSESSMENT — ACTIVITIES OF DAILY LIVING (ADL)
ADLS_ACUITY_SCORE: 33

## 2022-09-16 NOTE — PLAN OF CARE
Goal Outcome Evaluation:                    AVS reviewed with patient. Discharge medication reviewed with patient. Indications and instructions for new medication reviewed with patient. All questions answered. Teach back used to ensure understanding.  Belonging gathered and returned to patient. Patient dressed in her own clothing. Tele and PIV removed.

## 2022-09-16 NOTE — PLAN OF CARE
Goal Outcome Evaluation:    Observation goals:   -diagnostic tests and consults completed and resulted: not met  -vital signs normal or at patient's baseline: met  -tolerating oral intake to maintain hydration: in process. Met  -safe disposition plan has been identified: met

## 2022-09-16 NOTE — DISCHARGE SUMMARY
[facility]    Discharge Summary  Hospitalist    Date of Admission:  9/15/2022  Date of Discharge:  9/16/2022    Discharge Diagnoses      Choledocholithiasis  Acute biliary pancreatitis, unspecified complication status  Elevated liver enzymes  Epigastric pain    History of Present Illness   Judith Almeida is an 72 year old female who presented with right upper quadrant and epigastric pain with elevated liver enzymes and bilirubin with pancreatitis    Hospital Course   Judith Almeida was admitted on 9/15/2022.  The following problems were addressed during her hospitalization:    Judith Almeida is a 72 year old female w/ PMH of irritable bowel syndrome, hypothyroidism, hyperlipidemia, skin cancer registered observation on 9/15/2022 for possible choledocholithiasis     abnl LFTs w/ hepatocellular and cholestatic pattern, possible choledocholithiasis vs mild pancreatitis by ultrasound with CBD of 11 mm multiple hepatic cyst in a patient who status post cholecystectomy.  Concern for an obstructing choledocholithiasis.  Patient is status postcholecystectomy.  -NPO and IV fluids  -MRCP done on admission showed moderate intra and extrahepatic biliary dilation with no definite cause.  Could be secondary to a recently passed stone versus postcholecystectomy state although with elevated liver enzymes and lipase this is more than likely a passed stone.  Mild prominence of pancreatic duct measuring 0.4 cm.  -analgesia w/ low dose apap and antiemetics  -Triglycerides normalized.  Based on MRCP results GI team did not recommend any further intervention.  By next day her symptoms had completely resolved and her liver enzymes had significantly improved with resolution of hyperbilirubinemia and normalizing lipase.  At this time it is decided to discharge the patient with a follow-up with primary care physician in a week with repeat liver enzymes.     Hypotenion in ED 2/2 asovagal episode vs  medication side effects in ED following opioid administration w/o LOC  -telemetry  -orthostatic BP normal.  -ekg showed normal sinus rhythm.  -Opioids listed as intolerance/side effect     Hypokalemia, mild 3.2  -add on Mg level & replacement protocol  -replace K via protocol     HLD  -hold statin w/ acute hepatitis.  Restarted at the time of discharge with improving liver enzymes.     Hypothyroidism  -continue PTA levothryroxine     covid-19 status  -neg 09/15/22           Diet regular diet.  DVT Prophylaxis: Ambulate every shift  Cobb Catheter: Not present  Central Lines: None  Cardiac Monitoring: Telemetry x24 hours  Code Status: Full Code full code     Clinically Significant Risk Factors Present on Admission             # Hypoalbuminemia: Albumin = 3.2 g/dL (Ref range: 3.4 - 5.0 g/dL) on admission, will monitor as appropriate               Veronica Singh MD, MD    Code Status   Full Code       Primary Care Physician   Physician No Ref-Primary    Physical Exam   Temp: 98.2  F (36.8  C) Temp src: Oral BP: 126/80 Pulse: 74   Resp: 16 SpO2: 97 % O2 Device: None (Room air)    Vitals:    09/15/22 1101   Weight: 56.7 kg (125 lb)     Vital Signs with Ranges  Temp:  [97.9  F (36.6  C)-98.2  F (36.8  C)] 98.2  F (36.8  C)  Pulse:  [62-83] 74  Resp:  [16-18] 16  BP: (120-136)/(67-83) 126/80  SpO2:  [93 %-97 %] 97 %  I/O last 3 completed shifts:  In: 440 [P.O.:440]  Out: -     Physical Exam  Constitutional:       Appearance: Normal appearance.   HENT:      Head: Normocephalic.   Eyes:      Pupils: Pupils are equal, round, and reactive to light.   Cardiovascular:      Rate and Rhythm: Normal rate and regular rhythm.      Pulses: Normal pulses.      Heart sounds: Normal heart sounds.   Pulmonary:      Effort: Pulmonary effort is normal. No respiratory distress.      Breath sounds: Normal breath sounds.   Abdominal:      General: Abdomen is flat. Bowel sounds are normal. There is no distension.      Tenderness: There is no  abdominal tenderness. There is no guarding.   Musculoskeletal:         General: Normal range of motion.      Cervical back: Normal range of motion.   Skin:     General: Skin is warm and dry.   Neurological:      General: No focal deficit present.   Psychiatric:         Mood and Affect: Mood normal.           Discharge Disposition   Discharged to home  Condition at discharge: Stable    Consultations This Hospital Stay   GASTROENTEROLOGY IP CONSULT    Time Spent on this Encounter   Veronica MERCHANT MD, personally saw the patient today and spent greater than 30 minutes discharging this patient.    Discharge Orders      Reason for your hospital stay    Choledocholithiasis     Follow-up and recommended labs and tests     Follow up with primary care provider, Physician No Ref-Primary, within 7 days for hospital follow- up.  The following labs/tests are recommended: cbc, bmp in 1 week.     Activity    Your activity upon discharge: activity as tolerated     Diet    Follow this diet upon discharge: Orders Placed This Encounter      Regular Diet Adult       Discharge Medications   Discharge Medication List as of 9/16/2022 10:54 AM      START taking these medications    Details   potassium chloride (KLOR-CON) 20 MEQ packet Take 20 mEq by mouth daily for 7 days, Disp-7 packet, R-0, E-Prescribe         CONTINUE these medications which have NOT CHANGED    Details   aspirin 81 MG EC tablet Take 81 mg by mouth daily, Historical      bimatoprost (LATISSE) 0.03 % external opthalmic solution APPLY EVENLY ALONG THE SKIN OF THE UPPER EYELIDS AT THE BASE OF THE EYELASHES ONCE DAILY AT BEDTIME, Historical      dicyclomine (BENTYL) 10 MG capsule Take 1-2 capsules by mouth 3 times daily as needed for cramping, Historical      levothyroxine (SYNTHROID/LEVOTHROID) 50 MCG tablet Take 50 mcg by mouth daily, Historical      Multiple Vitamin (MULTIVITAMIN ADULT) TABS Take 1 tablet by mouth daily, Historical      nortriptyline (PAMELOR) 10 MG  capsule Take 10 mg by mouth At Bedtime, Historical      simvastatin (ZOCOR) 20 MG tablet Take 20 mg by mouth At Bedtime, Historical           Allergies   Allergies   Allergen Reactions     Dilaudid [Hydromorphone]      Hypotension, near syncope     Data   Recent Labs   Lab Test 09/16/22  0651 09/15/22  0649   WBC 4.6 8.3   HGB 11.5* 13.6   MCV 88 90    315      Recent Labs   Lab Test 09/16/22  0651 09/15/22  1603 09/15/22  0649     --  141   POTASSIUM 3.3* 3.6 3.2*   CHLORIDE 109  --  107   CO2 30  --  26   BUN 9  --  16   CR 0.57  --  0.54   ANIONGAP 3  --  8   LESLY 8.9  --  8.8   GLC 99  --  111*         Results for orders placed or performed during the hospital encounter of 09/15/22   US Abdomen Limited    Narrative    ULTRASOUND ABDOMEN LIMITED  9/15/2022 8:28 AM    CLINICAL HISTORY: Right lower quadrant pain.    TECHNIQUE: Limited abdominal ultrasound.    COMPARISON: None.    FINDINGS:  GALLBLADDER: Cholecystectomy.    BILE DUCTS: There is no biliary dilatation. The common duct measures  11 mm.    LIVER: Liver measures 16.7 cm. There are multiple hepatic cysts that  are mildly lobulated but otherwise appear simple. An example at the  lateral left liver is 1.8 x 1.1 x 1.8 cm. There are other bilateral  examples.    RIGHT KIDNEY: No hydronephrosis.    PANCREAS: Pancreas duct is mildly ectatic measuring 4 mm.    No ascites.      Impression    IMPRESSION:  1.  Cholecystectomy. Dilated common duct measuring 11 mm and mildly  ectatic pancreas duct measuring 4 mm.  2.  A few scattered hepatic cysts noted.    CINDY SUAZO MD         SYSTEM ID:  [id number]   MR Abdomen MRCP w/o & w Contrast    Narrative    MR ABDOMEN MRCP WITH AND WITHOUT CONTRAST September 15, 2022 1:01 PM    INDICATION: Biliary dilatation. Evaluate for choledocholithiasis.    COMPARISON: None.    TECHNIQUE: Routine MR liver/pancreas protocol including axial and  coronal MRCP sequences. 2D and 3D reconstruction performed by MR  technologist  including MIP reconstruction and slab cholangiograms. If  performed with contrast, additional dynamic T1 post IV contrast  images.  CONTRAST: 6 mL Gadavist.     FINDINGS:     MRCP: Postoperative changes of cholecystectomy. There is moderate  intra and extrahepatic biliary dilatation. The common duct measures up  to 1.4 cm in diameter. No biliary filling defects to suggest  choledocholithiasis. The pancreatic duct is mildly prominent at 0.4  cm.    LIVER: No evidence for fatty infiltration of the liver. There are  innumerable small hepatic cysts, with the largest in the lateral  segment of the left hepatic lobe measuring 1.3 cm. Numerous tiny  hypodensities in the liver are too small to characterize, but may also  represent cysts. No suspicious hepatic masses are identified.    PANCREAS: Unremarkable. No focal pancreatic lesions are seen.    ADDITIONAL FINDINGS: The spleen, adrenal glands, and kidneys are  unremarkable. No hydronephrosis. Visualized loops of small bowel and  colon are of normal caliber. No lymphadenopathy in the abdomen.      Impression    IMPRESSION:  1.  Moderate intra and extrahepatic biliary dilatation with no  definite cause identified. This finding could be related to the  patient's age and postcholecystectomy state, or possibly a recently  passed biliary stone. No convincing evidence for choledocholithiasis.  2.  Mild prominence of the pancreatic duct measures 0.4 cm. No other  pancreatic abnormalities.    ROSALIO PAINTING MD         SYSTEM ID:  [id number]

## 2022-09-16 NOTE — PROGRESS NOTES
Goal Outcome Evaluation:     Observation goals:   -diagnostic tests and consults completed and resulted: met  -vital signs normal or at patient's baseline: met  -tolerating oral intake to maintain hydration: in process. Met  -safe disposition plan has been identified: met

## 2022-09-16 NOTE — PLAN OF CARE
Goal Outcome Evaluation:                    Patient Dressed in her clothes, PIV and Tele removed. Belongings gathered and returned to patient. AVS reviewed with patient. New medication reviewed with patient. All questions answered. Teach back used to ensure understanding.  Wheel chair ride given to Door [address] where her spouse met her to assume care.

## 2022-09-16 NOTE — PLAN OF CARE
Goal Outcome Evaluation:     Observation goals:   -diagnostic tests and consults completed and resulted: met  -vital signs normal or at patient's baseline: met  -tolerating oral intake to maintain hydration: in process. Met  -safe disposition plan has been identified: met        Orientation/Cognitive: A&O x 4  Observation Goals (Met/ Not Met): met  Mobility Level/Assist Equipment: Ind  Fall Risk (Y/N): Y  Behavior Concerns: none  Pain Management: denies  Tele/VS/O2: NSR  ABNL Lab/BG: AST/ALT/lipase elevated  Diet: NPO  Bowel/Bladder: cont  Skin Concerns: none  Drains/Devices: IV fluids infusing  Tests/Procedures for next shift: recheck labs  Anticipated DC date & active delays: TBD  Patient Stated Goal for Today: none

## 2022-09-16 NOTE — PROGRESS NOTES
"[facility]  [facility]  Gastroenterology Progress note    Interval History:      Patient reports that she is pain free.      Vital Signs:      /80 (BP Location: Left arm)   Pulse 74   Temp 98.2  F (36.8  C) (Oral)   Resp 16   Ht 1.676 m (5' 6\")   Wt 56.7 kg (125 lb)   SpO2 97%   BMI 20.18 kg/m    Temp (24hrs), Av  F (36.7  C), Min:97.6  F (36.4  C), Max:98.2  F (36.8  C)    Patient Vitals for the past 72 hrs:   Weight   09/15/22 1101 56.7 kg (125 lb)       Intake/Output Summary (Last 24 hours) at 2022 0901  Last data filed at 9/15/2022 1800  Gross per 24 hour   Intake 240 ml   Output --   Net 240 ml         Constitutional: NAD, comfortable  Cardiovascular: RRR, normal S1, S2   Respiratory: CTAB  Abdomen: soft, non-tender, nondistended    Additional Comments:  ROS, FH, SH: See initial GI consult for details.    Laboratory Data:  Recent Labs   Lab Test 22  0651 09/15/22  0649   WBC 4.6 8.3   HGB 11.5* 13.6   MCV 88 90    315     Recent Labs   Lab Test 22  0651 09/15/22  1603 09/15/22  0649     --  141   POTASSIUM 3.3* 3.6 3.2*   CHLORIDE 109  --  107   CO2 30  --  26   BUN 9  --  16   CR 0.57  --  0.54   ANIONGAP 3  --  8   LESLY 8.9  --  8.8     Recent Labs   Lab Test 22  0651 09/15/22  0649   ALBUMIN 3.2* 3.7   BILITOTAL 0.7 1.5*   * 411*   * 920*   ALKPHOS 115 126   LIPASE 305 508*         Assessment:  71 yo female who presents with abdominal pain.  She is s/p cholecystectomy >10 years ago but has had episodic issues with abdominal pain.    She reports epigastric and RUQ pain with mild nausea.  Liver function tests elevated with AST > ALT.  Ultrasound shows s/p cholecystectomy with a CBD of 11 mm.  MRCP with moderate intra and extrahepatic biliary dilatation with no definite cause identified. No convincing evidence of choledocholithiasis.  Mild prominence of the pancreatic duct, measuring 0.4 cm.  No other pancreatic " abnormalities.  Liver function tests are significantly better today with  -> 302.  .  Total bilirubin is normalized at 0.7.  Pain is resolved.  Suspect passed stone.  Plan:  -  No indication for EUS/ERCP at this time.  Suspect passed stone given improvement in LFT/MRCP results.  -  Continue to trend LFTs.  -  Advance diet as tolerated.  -  Patient will follow up with her GI in [place].  -  Will sign off.  Please call with questions.    Total Time Spent: 15 minutes      NAIMA Frey  [facility]  Office:  [phone] call if needed after 12PM  Cell:  [phone], not available after 12PM at this number

## 2022-09-16 NOTE — PROGRESS NOTES
diagnostic tests and consults completed and resulted - Met   -vital signs normal or at patient baseline - Met   -tolerating oral intake to maintain hydration - Met   -adequate pain control on oral analgesics - Met   -returns to baseline functional status - Met   -safe disposition plan has been identified  - Met     Hospitalist notified that patient requesting discharge this morning.

## 2022-09-16 NOTE — PLAN OF CARE
Goal Outcome Evaluation:                    Patient A&0x4. Up ad ismael. Abdomin is soft and non tender. Denies pain. Denies nausea. Diet increased to regular.  IV SL. Urinating well. Lungs clear/ No edema. On RA. VSS.

## 2022-10-03 ENCOUNTER — HEALTH MAINTENANCE LETTER (OUTPATIENT)
Age: 72
End: 2022-10-03

## 2023-05-21 ENCOUNTER — HEALTH MAINTENANCE LETTER (OUTPATIENT)
Age: 73
End: 2023-05-21

## 2024-07-28 ENCOUNTER — HEALTH MAINTENANCE LETTER (OUTPATIENT)
Age: 74
End: 2024-07-28

## 2024-10-06 ENCOUNTER — HEALTH MAINTENANCE LETTER (OUTPATIENT)
Age: 74
End: 2024-10-06

## 2025-08-10 ENCOUNTER — HEALTH MAINTENANCE LETTER (OUTPATIENT)
Age: 75
End: 2025-08-10